# Patient Record
Sex: FEMALE | Race: ASIAN | NOT HISPANIC OR LATINO | Employment: FULL TIME | URBAN - METROPOLITAN AREA
[De-identification: names, ages, dates, MRNs, and addresses within clinical notes are randomized per-mention and may not be internally consistent; named-entity substitution may affect disease eponyms.]

---

## 2018-02-25 ENCOUNTER — HOSPITAL ENCOUNTER (EMERGENCY)
Facility: HOSPITAL | Age: 63
Discharge: HOME | End: 2018-02-25
Attending: EMERGENCY MEDICINE
Payer: COMMERCIAL

## 2018-02-25 VITALS
BODY MASS INDEX: 21.31 KG/M2 | OXYGEN SATURATION: 98 % | TEMPERATURE: 98 F | RESPIRATION RATE: 16 BRPM | DIASTOLIC BLOOD PRESSURE: 79 MMHG | WEIGHT: 110 LBS | SYSTOLIC BLOOD PRESSURE: 158 MMHG | HEART RATE: 77 BPM

## 2018-02-25 DIAGNOSIS — I10 HYPERTENSION: ICD-10-CM

## 2018-02-25 LAB
ALBUMIN SERPL BCP-MCNC: 4.2 G/DL
ALP SERPL-CCNC: 112 U/L
ALT SERPL W/O P-5'-P-CCNC: 42 U/L
ANION GAP SERPL CALC-SCNC: 12 MMOL/L
AST SERPL-CCNC: 36 U/L
BACTERIA #/AREA URNS HPF: NORMAL /HPF
BILIRUB SERPL-MCNC: 0.6 MG/DL
BILIRUB UR QL STRIP: NEGATIVE
BUN SERPL-MCNC: 11 MG/DL
CALCIUM SERPL-MCNC: 10 MG/DL
CHLORIDE SERPL-SCNC: 107 MMOL/L
CLARITY UR: CLEAR
CO2 SERPL-SCNC: 23 MMOL/L
COLOR UR: YELLOW
CREAT SERPL-MCNC: 0.7 MG/DL
EST. GFR  (AFRICAN AMERICAN): >60 ML/MIN/1.73 M^2
EST. GFR  (NON AFRICAN AMERICAN): >60 ML/MIN/1.73 M^2
GLUCOSE SERPL-MCNC: 143 MG/DL
GLUCOSE UR QL STRIP: NEGATIVE
HGB UR QL STRIP: NEGATIVE
KETONES UR QL STRIP: NEGATIVE
LEUKOCYTE ESTERASE UR QL STRIP: ABNORMAL
MICROSCOPIC COMMENT: NORMAL
NITRITE UR QL STRIP: NEGATIVE
PH UR STRIP: 7 [PH] (ref 5–8)
POTASSIUM SERPL-SCNC: 3.8 MMOL/L
PROT SERPL-MCNC: 8.4 G/DL
PROT UR QL STRIP: NEGATIVE
RBC #/AREA URNS HPF: 2 /HPF (ref 0–4)
SODIUM SERPL-SCNC: 142 MMOL/L
SP GR UR STRIP: 1.01 (ref 1–1.03)
SQUAMOUS #/AREA URNS HPF: 1 /HPF
URN SPEC COLLECT METH UR: ABNORMAL
UROBILINOGEN UR STRIP-ACNC: NEGATIVE EU/DL
WBC #/AREA URNS HPF: 3 /HPF (ref 0–5)

## 2018-02-25 PROCEDURE — 99284 EMERGENCY DEPT VISIT MOD MDM: CPT | Mod: 25

## 2018-02-25 PROCEDURE — 81000 URINALYSIS NONAUTO W/SCOPE: CPT

## 2018-02-25 PROCEDURE — 96376 TX/PRO/DX INJ SAME DRUG ADON: CPT

## 2018-02-25 PROCEDURE — 25000003 PHARM REV CODE 250: Performed by: EMERGENCY MEDICINE

## 2018-02-25 PROCEDURE — 93010 ELECTROCARDIOGRAM REPORT: CPT | Mod: ,,, | Performed by: INTERNAL MEDICINE

## 2018-02-25 PROCEDURE — 93005 ELECTROCARDIOGRAM TRACING: CPT

## 2018-02-25 PROCEDURE — 96374 THER/PROPH/DIAG INJ IV PUSH: CPT

## 2018-02-25 PROCEDURE — 80053 COMPREHEN METABOLIC PANEL: CPT

## 2018-02-25 RX ORDER — CHLORTHALIDONE 25 MG/1
25 TABLET ORAL DAILY
COMMUNITY

## 2018-02-25 RX ORDER — ENALAPRILAT 1.25 MG/ML
2.5 INJECTION INTRAVENOUS
Status: COMPLETED | OUTPATIENT
Start: 2018-02-25 | End: 2018-02-25

## 2018-02-25 RX ORDER — NEOMYCIN SULFATE, POLYMYXIN B SULFATE AND HYDROCORTISONE 10; 3.5; 1 MG/ML; MG/ML; [USP'U]/ML
4 SUSPENSION/ DROPS AURICULAR (OTIC) 4 TIMES DAILY
Qty: 10 ML | Refills: 1 | Status: SHIPPED | OUTPATIENT
Start: 2018-02-25 | End: 2018-03-01

## 2018-02-25 RX ORDER — LISINOPRIL 40 MG/1
40 TABLET ORAL DAILY
COMMUNITY

## 2018-02-25 RX ORDER — LOSARTAN POTASSIUM 100 MG/1
100 TABLET ORAL DAILY
COMMUNITY

## 2018-02-25 RX ORDER — AMLODIPINE BESYLATE 5 MG/1
5 TABLET ORAL DAILY
COMMUNITY

## 2018-02-25 RX ORDER — CLONIDINE HYDROCHLORIDE 0.1 MG/1
0.1 TABLET ORAL
Status: COMPLETED | OUTPATIENT
Start: 2018-02-25 | End: 2018-02-25

## 2018-02-25 RX ADMIN — ENALAPRILAT 2.5 MG: 2.5 INJECTION INTRAVENOUS at 12:02

## 2018-02-25 RX ADMIN — ENALAPRILAT 2.5 MG: 2.5 INJECTION INTRAVENOUS at 11:02

## 2018-02-25 RX ADMIN — CLONIDINE HYDROCHLORIDE 0.1 MG: 0.1 TABLET ORAL at 12:02

## 2018-02-25 NOTE — ED PROVIDER NOTES
" Encounter Date: 2/25/2018    SCRIBE #1 NOTE: I, Catarino Zamora, am scribing for, and in the presence of, William Rueda MD.       History     Chief Complaint   Patient presents with    Hypertension     reports 195/121 after taking Norvasc this morning; reports "a little blood came out ear (left);" family reports she has been off her meds for 3 days because she thought she had the flu; reports sore throat and cough; denies chest pain; reports taking OTC cough medications     CC: High Blood Pressure    62 y.o.female with HTN presents to the ED c/o having high blood pressure this morning after not taking her Norvasc for the last 3 days because of a cold she had. She worried after noticing blood in her right ear. The patient reports that the fever and chills she had have subsided. She denies chest pain, SOB, ear pain, nausea, vomiting, diarrhea, and any other symptoms.          Review of patient's allergies indicates:  No Known Allergies  Past Medical History:   Diagnosis Date    Hypertension      History reviewed. No pertinent surgical history.  Family History   Problem Relation Age of Onset    Diabetes Mother      Social History   Substance Use Topics    Smoking status: Never Smoker    Smokeless tobacco: Never Used    Alcohol use No     Review of Systems   Constitutional: Negative for chills and fever.   HENT: Negative for congestion, ear pain and rhinorrhea.         (+) bleeding from right ear   Eyes: Negative for pain and visual disturbance.   Respiratory: Negative for cough and shortness of breath.    Cardiovascular: Negative for chest pain.   Gastrointestinal: Negative for abdominal pain, diarrhea, nausea and vomiting.   Genitourinary: Negative for dysuria.   Musculoskeletal: Negative for back pain and neck pain.   Skin: Negative for rash.   Neurological: Negative for headaches.       Physical Exam     Initial Vitals [02/25/18 1117]   BP Pulse Resp Temp SpO2   (!) 238/108 86 20 97.1 °F (36.2 °C) 99 %      MAP  "      151.33         Physical Exam    Nursing note and vitals reviewed.  Constitutional: She appears well-developed and well-nourished.  Non-toxic appearance. She does not appear ill.   HENT:   Head: Normocephalic and atraumatic.   Eyes: EOM are normal.   Neck: Neck supple.   Cardiovascular: Normal rate and regular rhythm.   Murmur heard.   Systolic murmur is present with a grade of 2/6   Pulmonary/Chest: Effort normal and breath sounds normal. No respiratory distress.   Abdominal: Soft. Normal appearance and bowel sounds are normal. She exhibits no distension. There is no tenderness.   Musculoskeletal: Normal range of motion.   Neurological: She is alert.   Skin: Skin is warm and dry.   Psychiatric: She has a normal mood and affect.         ED Course   Procedures  Labs Reviewed   COMPREHENSIVE METABOLIC PANEL - Abnormal; Notable for the following:        Result Value    Glucose 143 (*)     All other components within normal limits   URINALYSIS - Abnormal; Notable for the following:     Leukocytes, UA Trace (*)     All other components within normal limits   URINALYSIS MICROSCOPIC     Imaging Results    None                 Medical Decision Making:   ED Management:  At 1400 hours the patient's blood pressure has gone down to 142/80. She is safe for discharge and has agreed to continue taking her blood pressure medication as prescribed. She will follow up with her doctor next week. She has verbalized understanding through her daughter, who is the .             Scribe Attestation:   Scribe #1: I performed the above scribed service and the documentation accurately describes the services I performed. I attest to the accuracy of the note.    Attending Attestation:           Physician Attestation for Scribe:  Physician Attestation Statement for Scribe #1: I, William Rueda MD, reviewed documentation, as scribed by Ctaarino Zamora in my presence, and it is both accurate and complete.                 ED Course as of  Mar 21 0650   Sun Feb 25, 2018   1410 No evidence of end-organ damage on lab survey.  [KJ]   1411 Blood pressure 142/84 at this time.  Patient able to stand, ambulate without difficulty and without being dizzy: She is otherwise asymptomatic.  The importance of taking both antihypertensive medicines was emphasized, through her family member .  She indicated understanding.  As also noted on physical exam that she had an excoriated right external auditory canal: Treat this with some topical, antibiotic otic drops  [KJ]      ED Course User Index  [KJ] William Rueda MD     Clinical Impression:   The encounter diagnosis was Hypertension.                           William Rueda MD  03/21/18 0615

## 2018-02-25 NOTE — ED TRIAGE NOTES
"Pt arrived via personal transportation from home. CC of hypertension. Pt family at bedside stated "she has been sick the past few days, so she hasn't taken any of her medications. Today she took her blood pressure and it was high so she took the norvasc." Pt presents with elevated BP, denies N/V/headache/blurry vision. Pt also reports cleaning her ears with q-tip and saw bloody drainage from the right ear. NAD at this time.  "

## 2021-08-17 DIAGNOSIS — Z12.31 SCREENING MAMMOGRAM, ENCOUNTER FOR: Primary | ICD-10-CM

## 2021-09-15 ENCOUNTER — HOSPITAL ENCOUNTER (OUTPATIENT)
Dept: RADIOLOGY | Facility: HOSPITAL | Age: 66
Discharge: HOME | End: 2021-09-15
Attending: FAMILY MEDICINE
Payer: COMMERCIAL

## 2021-09-15 VITALS — WEIGHT: 110 LBS | BODY MASS INDEX: 21.31 KG/M2

## 2021-09-15 DIAGNOSIS — Z12.31 SCREENING MAMMOGRAM, ENCOUNTER FOR: ICD-10-CM

## 2021-09-15 PROCEDURE — 77063 BREAST TOMOSYNTHESIS BI: CPT | Mod: 26,,, | Performed by: RADIOLOGY

## 2021-09-15 PROCEDURE — 77063 MAMMO DIGITAL SCREENING BILAT WITH TOMO: ICD-10-PCS | Mod: 26,,, | Performed by: RADIOLOGY

## 2021-09-15 PROCEDURE — 77067 SCR MAMMO BI INCL CAD: CPT | Mod: TC

## 2021-09-15 PROCEDURE — 77067 SCR MAMMO BI INCL CAD: CPT | Mod: 26,,, | Performed by: RADIOLOGY

## 2021-09-15 PROCEDURE — 77067 MAMMO DIGITAL SCREENING BILAT WITH TOMO: ICD-10-PCS | Mod: 26,,, | Performed by: RADIOLOGY

## 2021-09-29 ENCOUNTER — HOSPITAL ENCOUNTER (OUTPATIENT)
Dept: RADIOLOGY | Facility: HOSPITAL | Age: 66
Discharge: HOME | End: 2021-09-29
Attending: FAMILY MEDICINE
Payer: COMMERCIAL

## 2021-09-29 DIAGNOSIS — R92.8 ABNORMAL MAMMOGRAM OF LEFT BREAST: ICD-10-CM

## 2021-09-29 PROCEDURE — 77061 BREAST TOMOSYNTHESIS UNI: CPT | Mod: TC,LT

## 2021-09-29 PROCEDURE — 77061 MAMMO DIGITAL DIAGNOSTIC LEFT WITH TOMO: ICD-10-PCS | Mod: 26,LT,, | Performed by: RADIOLOGY

## 2021-09-29 PROCEDURE — 77065 DX MAMMO INCL CAD UNI: CPT | Mod: 26,LT,, | Performed by: RADIOLOGY

## 2021-09-29 PROCEDURE — 77061 BREAST TOMOSYNTHESIS UNI: CPT | Mod: 26,LT,, | Performed by: RADIOLOGY

## 2021-09-29 PROCEDURE — 77065 MAMMO DIGITAL DIAGNOSTIC LEFT WITH TOMO: ICD-10-PCS | Mod: 26,LT,, | Performed by: RADIOLOGY

## 2022-10-05 ENCOUNTER — HOSPITAL ENCOUNTER (OUTPATIENT)
Dept: RADIOLOGY | Facility: HOSPITAL | Age: 67
Discharge: HOME | End: 2022-10-05
Attending: FAMILY MEDICINE

## 2022-10-05 VITALS — BODY MASS INDEX: 21.6 KG/M2 | HEIGHT: 60 IN | WEIGHT: 110 LBS

## 2022-10-05 DIAGNOSIS — Z12.31 VISIT FOR SCREENING MAMMOGRAM: ICD-10-CM

## 2022-10-05 PROCEDURE — 77063 MAMMO DIGITAL SCREENING BILAT WITH TOMO: ICD-10-PCS | Mod: 26,,, | Performed by: RADIOLOGY

## 2022-10-05 PROCEDURE — 77067 MAMMO DIGITAL SCREENING BILAT WITH TOMO: ICD-10-PCS | Mod: 26,,, | Performed by: RADIOLOGY

## 2022-10-05 PROCEDURE — 77067 SCR MAMMO BI INCL CAD: CPT | Mod: 26,,, | Performed by: RADIOLOGY

## 2022-10-05 PROCEDURE — 77063 BREAST TOMOSYNTHESIS BI: CPT | Mod: 26,,, | Performed by: RADIOLOGY

## 2022-10-05 PROCEDURE — 77063 BREAST TOMOSYNTHESIS BI: CPT | Mod: TC

## 2023-09-27 DIAGNOSIS — Z12.31 ENCOUNTER FOR SCREENING MAMMOGRAM FOR MALIGNANT NEOPLASM OF BREAST: Primary | ICD-10-CM

## 2023-11-01 ENCOUNTER — HOSPITAL ENCOUNTER (OUTPATIENT)
Dept: RADIOLOGY | Facility: HOSPITAL | Age: 68
Discharge: HOME | End: 2023-11-01
Attending: FAMILY MEDICINE
Payer: MEDICARE

## 2023-11-01 DIAGNOSIS — Z12.31 ENCOUNTER FOR SCREENING MAMMOGRAM FOR MALIGNANT NEOPLASM OF BREAST: ICD-10-CM

## 2023-11-01 PROCEDURE — 77067 MAMMO DIGITAL SCREENING BILAT WITH TOMO: ICD-10-PCS | Mod: 26,,, | Performed by: RADIOLOGY

## 2023-11-01 PROCEDURE — 77067 SCR MAMMO BI INCL CAD: CPT | Mod: 26,,, | Performed by: RADIOLOGY

## 2023-11-01 PROCEDURE — 77067 SCR MAMMO BI INCL CAD: CPT | Mod: TC

## 2023-11-01 PROCEDURE — 77063 MAMMO DIGITAL SCREENING BILAT WITH TOMO: ICD-10-PCS | Mod: 26,,, | Performed by: RADIOLOGY

## 2023-11-01 PROCEDURE — 77063 BREAST TOMOSYNTHESIS BI: CPT | Mod: 26,,, | Performed by: RADIOLOGY

## 2024-04-17 ENCOUNTER — OFFICE VISIT (OUTPATIENT)
Dept: CARDIOLOGY | Facility: CLINIC | Age: 69
End: 2024-04-17
Payer: MEDICARE

## 2024-04-17 VITALS
OXYGEN SATURATION: 99 % | SYSTOLIC BLOOD PRESSURE: 142 MMHG | DIASTOLIC BLOOD PRESSURE: 86 MMHG | BODY MASS INDEX: 21.92 KG/M2 | HEART RATE: 65 BPM | WEIGHT: 112.19 LBS

## 2024-04-17 DIAGNOSIS — E78.49 OTHER HYPERLIPIDEMIA: ICD-10-CM

## 2024-04-17 DIAGNOSIS — I10 PRIMARY HYPERTENSION: Primary | ICD-10-CM

## 2024-04-17 DIAGNOSIS — R07.89 OTHER CHEST PAIN: ICD-10-CM

## 2024-04-17 DIAGNOSIS — E11.9 TYPE 2 DIABETES MELLITUS WITHOUT COMPLICATION, WITHOUT LONG-TERM CURRENT USE OF INSULIN: ICD-10-CM

## 2024-04-17 PROCEDURE — 3079F DIAST BP 80-89 MM HG: CPT | Mod: CPTII,S$GLB,, | Performed by: INTERNAL MEDICINE

## 2024-04-17 PROCEDURE — 1101F PT FALLS ASSESS-DOCD LE1/YR: CPT | Mod: CPTII,S$GLB,, | Performed by: INTERNAL MEDICINE

## 2024-04-17 PROCEDURE — 1126F AMNT PAIN NOTED NONE PRSNT: CPT | Mod: CPTII,S$GLB,, | Performed by: INTERNAL MEDICINE

## 2024-04-17 PROCEDURE — 99204 OFFICE O/P NEW MOD 45 MIN: CPT | Mod: S$GLB,,, | Performed by: INTERNAL MEDICINE

## 2024-04-17 PROCEDURE — 3077F SYST BP >= 140 MM HG: CPT | Mod: CPTII,S$GLB,, | Performed by: INTERNAL MEDICINE

## 2024-04-17 PROCEDURE — 93000 ELECTROCARDIOGRAM COMPLETE: CPT | Mod: S$GLB,,, | Performed by: INTERNAL MEDICINE

## 2024-04-17 PROCEDURE — 1159F MED LIST DOCD IN RCRD: CPT | Mod: CPTII,S$GLB,, | Performed by: INTERNAL MEDICINE

## 2024-04-17 PROCEDURE — 3008F BODY MASS INDEX DOCD: CPT | Mod: CPTII,S$GLB,, | Performed by: INTERNAL MEDICINE

## 2024-04-17 PROCEDURE — 99999 PR PBB SHADOW E&M-EST. PATIENT-LVL III: CPT | Mod: PBBFAC,,, | Performed by: INTERNAL MEDICINE

## 2024-04-17 PROCEDURE — 3288F FALL RISK ASSESSMENT DOCD: CPT | Mod: CPTII,S$GLB,, | Performed by: INTERNAL MEDICINE

## 2024-04-17 RX ORDER — ROSUVASTATIN CALCIUM 40 MG/1
40 TABLET, COATED ORAL NIGHTLY
COMMUNITY

## 2024-04-17 RX ORDER — DAPAGLIFLOZIN 10 MG/1
10 TABLET, FILM COATED ORAL DAILY
COMMUNITY

## 2024-04-17 RX ORDER — NAPROXEN 500 MG/1
500 TABLET ORAL 2 TIMES DAILY WITH MEALS
COMMUNITY

## 2024-04-17 RX ORDER — AMLODIPINE BESYLATE 10 MG/1
10 TABLET ORAL DAILY
COMMUNITY

## 2024-04-17 RX ORDER — METFORMIN HYDROCHLORIDE 750 MG/1
750 TABLET, EXTENDED RELEASE ORAL 2 TIMES DAILY
COMMUNITY
Start: 2024-04-15

## 2024-04-17 RX ORDER — SITAGLIPTIN 100 MG/1
100 TABLET, FILM COATED ORAL DAILY
COMMUNITY
Start: 2024-04-15

## 2024-04-17 RX ORDER — TIZANIDINE HYDROCHLORIDE 2 MG/1
CAPSULE, GELATIN COATED ORAL
COMMUNITY

## 2024-04-17 NOTE — PROGRESS NOTES
Cardiology    4/17/2024  9:41 AM    Problem list  Patient Active Problem List   Diagnosis    Hypertension    Type 2 diabetes mellitus without complication, without long-term current use of insulin    Other hyperlipidemia       CC:  Cardiac checkup    HPI:  Patient is self-referred for cardiac evaluation.  Patient is a pleasant 68-year-old woman with a history of hypertension, diabetes, hyperlipidemia who presents for cardiac checkup.  She denies any prior cardiac problems.  Patient states that she gets shortness of breath on occasion especially when she is laughing.  She denies any exertional symptoms.  She denies any palpitation or syncope.  She does not smoke.  Family history is negative for premature coronary disease.  Her mother had cardiac problems after 65.    Medications  Current Outpatient Medications   Medication Sig Dispense Refill    amLODIPine (NORVASC) 10 MG tablet Take 10 mg by mouth once daily.      dapagliflozin propanediol (FARXIGA) 10 mg tablet Take 10 mg by mouth once daily.      JANUVIA 100 mg Tab Take 100 mg by mouth once daily.      metFORMIN (GLUCOPHAGE-XR) 750 MG ER 24hr tablet Take 750 mg by mouth 2 (two) times daily.      naproxen (EC NAPROSYN) 500 MG EC tablet Take 500 mg by mouth 2 (two) times daily with meals.      rosuvastatin (CRESTOR) 40 MG Tab Take 40 mg by mouth every evening.      tiZANidine 2 mg Cap Take by mouth.       No current facility-administered medications for this visit.      Prior to Admission medications    Medication Sig Start Date End Date Taking? Authorizing Provider   amLODIPine (NORVASC) 10 MG tablet Take 10 mg by mouth once daily.   Yes Provider, Historical   dapagliflozin propanediol (FARXIGA) 10 mg tablet Take 10 mg by mouth once daily.   Yes Provider, Historical   JANUVIA 100 mg Tab Take 100 mg by mouth once daily. 4/15/24  Yes Provider, Historical   metFORMIN (GLUCOPHAGE-XR) 750 MG ER 24hr tablet Take 750 mg by mouth 2 (two) times daily. 4/15/24  Yes  Provider, Historical   naproxen (EC NAPROSYN) 500 MG EC tablet Take 500 mg by mouth 2 (two) times daily with meals.   Yes Provider, Historical   rosuvastatin (CRESTOR) 40 MG Tab Take 40 mg by mouth every evening.   Yes Provider, Historical   tiZANidine 2 mg Cap Take by mouth.   Yes Provider, Historical   amLODIPine (NORVASC) 5 MG tablet Take 5 mg by mouth once daily.  4/17/24  Provider, Historical   canagliflozin-metformin (INVOKAMET) 150-1,000 mg Tab Take by mouth 2 (two) times daily.  4/17/24  Provider, Historical   chlorthalidone (HYGROTEN) 25 MG Tab Take 25 mg by mouth once daily.  4/17/24  Provider, Historical   lisinopril (PRINIVIL,ZESTRIL) 40 MG tablet Take 40 mg by mouth once daily.  4/17/24  Provider, Historical   losartan (COZAAR) 100 MG tablet Take 100 mg by mouth once daily.  4/17/24  Provider, Historical         History  Past Medical History:   Diagnosis Date    Hypertension      No past surgical history on file.  Social History     Socioeconomic History    Marital status:    Tobacco Use    Smoking status: Never    Smokeless tobacco: Never   Substance and Sexual Activity    Alcohol use: No    Drug use: No    Sexual activity: Yes     Partners: Male     Birth control/protection: Post-menopausal   Social History Narrative     for 18 years.     works at [employer].                 Allergies  Review of patient's allergies indicates:  No Known Allergies      Review of Systems   Review of Systems   Constitutional: Negative for decreased appetite, fever and weight loss.   HENT:  Negative for congestion and nosebleeds.    Eyes:  Negative for double vision, vision loss in left eye, vision loss in right eye and visual disturbance.   Cardiovascular:  Negative for chest pain, claudication, cyanosis, dyspnea on exertion, irregular heartbeat, leg swelling, near-syncope, orthopnea, palpitations, paroxysmal nocturnal dyspnea and syncope.   Respiratory:  Negative for cough, hemoptysis, shortness of  breath, sleep disturbances due to breathing, snoring, sputum production and wheezing.    Endocrine: Negative for cold intolerance and heat intolerance.   Skin:  Negative for nail changes and rash.   Musculoskeletal:  Negative for joint pain, muscle cramps, muscle weakness and myalgias.   Gastrointestinal:  Negative for change in bowel habit, heartburn, hematemesis, hematochezia, hemorrhoids and melena.   Neurological:  Negative for dizziness, focal weakness and headaches.         Physical Exam  Wt Readings from Last 1 Encounters:   04/17/24 50.9 kg (112 lb 3.4 oz)     BP Readings from Last 3 Encounters:   04/17/24 (!) 142/86   02/25/18 (!) 158/79   10/18/12 154/88     Pulse Readings from Last 1 Encounters:   04/17/24 65     Body mass index is 21.92 kg/m².    Physical Exam  Constitutional:       Appearance: She is well-developed.   HENT:      Head: Atraumatic.   Eyes:      General: No scleral icterus.  Neck:      Vascular: Normal carotid pulses. No carotid bruit, hepatojugular reflux or JVD.   Cardiovascular:      Rate and Rhythm: Normal rate and regular rhythm.      Chest Wall: PMI is not displaced.      Pulses: Intact distal pulses.           Carotid pulses are 2+ on the right side and 2+ on the left side.       Radial pulses are 2+ on the right side and 2+ on the left side.        Dorsalis pedis pulses are 2+ on the right side and 2+ on the left side.      Heart sounds: Normal heart sounds, S1 normal and S2 normal. No murmur heard.     No friction rub.   Pulmonary:      Effort: Pulmonary effort is normal. No respiratory distress.      Breath sounds: Normal breath sounds. No stridor. No wheezing or rales.   Chest:      Chest wall: No tenderness.   Abdominal:      General: Bowel sounds are normal.      Palpations: Abdomen is soft.   Musculoskeletal:      Cervical back: Neck supple. No edema.   Skin:     General: Skin is warm and dry.      Nails: There is no clubbing.   Neurological:      Mental Status: She is alert  and oriented to person, place, and time.   Psychiatric:         Behavior: Behavior normal.         Thought Content: Thought content normal.             Assessment  1. Primary hypertension  Stable.  's at home.  Continue medications and monitor and adjust if needed.  Did not tolerate losartan due to itching and stopped by PCP    2. Type 2 diabetes mellitus without complication, without long-term current use of insulin  Stable on meds per PCP    3. Other hyperlipidemia  On statin    4. Other chest pain  atypical  - EKG 12-lead  - Echo; Future        Plan and Discussion  Discussed her EKG today which showed normal sinus rhythm rate of 62.  Discussed her blood pressure is well controlled at home recommend to continue current medications and monitor.  Will get an echocardiogram to assess atypical pain.      Follow Up  TBD      Scott Kaur MD, F.A.C.C, F.S.C.A.I.      Total professional time spent for the encounter: 40 minutes  Time was spent preparing to see the patient, reviewing results of prior testing, obtaining and/or reviewing separately obtained history, performing a medically appropriate examination and interview, counseling and educating the patient/family, ordering medications/tests/procedures, referring and communicating with other health care professionals, documenting clinical information in the electronic health record, and independently interpreting results.    Disclaimer: This document was created using voice recognition software (M*Modal Fluency Direct). Although it may be edited, this document may contain errors related to incorrect recognition of the spoken word. Please call the physician if clarification is needed.

## 2024-05-09 LAB
OHS QRS DURATION: 84 MS
OHS QTC CALCULATION: 448 MS

## 2024-10-22 ENCOUNTER — TELEPHONE (OUTPATIENT)
Dept: ENDOCRINOLOGY | Facility: CLINIC | Age: 69
End: 2024-10-22
Payer: MEDICARE

## 2024-10-22 NOTE — TELEPHONE ENCOUNTER
----- Message from Evita sent at 10/22/2024 11:13 AM CDT -----  Regarding: Daughter  Patient is requesting a sooner appointment.  Patient declined first available appointment listed as well as another facility and provider .  Patient will not accept being placed on the waitlist and is requesting a message be sent to doctor.    Name of Caller: Daughter    When is the first available appointment? Pt is scheduled for 11/21    Symptoms: Diabetes    Would the patient rather a call back or a response via [facility]? call    Best Call Back Number: [phone]      Additional Information: pt got letter in the mail with an already scheduled date of 11/21/24. Daughter was asking if she could be seen Wednesday 11/20/24 if possible. Daughter was requesting Dr. Jordy Aguirre, I'm not sure if office can accommodate that.

## 2025-01-17 ENCOUNTER — PATIENT OUTREACH (OUTPATIENT)
Dept: ADMINISTRATIVE | Facility: HOSPITAL | Age: 70
End: 2025-01-17
Payer: MEDICARE

## 2025-01-17 LAB — MICROALBUMIN/CREATININE RATIO: NORMAL UG/MG

## 2025-03-13 ENCOUNTER — LAB VISIT (OUTPATIENT)
Dept: LAB | Facility: HOSPITAL | Age: 70
End: 2025-03-13
Attending: HOSPITALIST
Payer: MEDICARE

## 2025-03-13 ENCOUNTER — OFFICE VISIT (OUTPATIENT)
Dept: ENDOCRINOLOGY | Facility: CLINIC | Age: 70
End: 2025-03-13
Payer: MEDICARE

## 2025-03-13 VITALS
DIASTOLIC BLOOD PRESSURE: 88 MMHG | HEART RATE: 62 BPM | BODY MASS INDEX: 21.56 KG/M2 | SYSTOLIC BLOOD PRESSURE: 126 MMHG | WEIGHT: 110.38 LBS

## 2025-03-13 DIAGNOSIS — E78.49 OTHER HYPERLIPIDEMIA: ICD-10-CM

## 2025-03-13 DIAGNOSIS — E11.65 TYPE 2 DIABETES MELLITUS WITH HYPERGLYCEMIA, WITHOUT LONG-TERM CURRENT USE OF INSULIN: ICD-10-CM

## 2025-03-13 DIAGNOSIS — I10 PRIMARY HYPERTENSION: ICD-10-CM

## 2025-03-13 DIAGNOSIS — E11.9 TYPE 2 DIABETES MELLITUS WITHOUT COMPLICATION, WITHOUT LONG-TERM CURRENT USE OF INSULIN: ICD-10-CM

## 2025-03-13 DIAGNOSIS — E11.65 TYPE 2 DIABETES MELLITUS WITH HYPERGLYCEMIA, WITHOUT LONG-TERM CURRENT USE OF INSULIN: Primary | ICD-10-CM

## 2025-03-13 LAB
ESTIMATED AVG GLUCOSE: 154 MG/DL (ref 68–131)
HBA1C MFR BLD: 7 % (ref 4–5.6)

## 2025-03-13 PROCEDURE — 1101F PT FALLS ASSESS-DOCD LE1/YR: CPT | Mod: CPTII,S$GLB,, | Performed by: HOSPITALIST

## 2025-03-13 PROCEDURE — 3288F FALL RISK ASSESSMENT DOCD: CPT | Mod: CPTII,S$GLB,, | Performed by: HOSPITALIST

## 2025-03-13 PROCEDURE — 4010F ACE/ARB THERAPY RXD/TAKEN: CPT | Mod: CPTII,S$GLB,, | Performed by: HOSPITALIST

## 2025-03-13 PROCEDURE — 99204 OFFICE O/P NEW MOD 45 MIN: CPT | Mod: S$GLB,,, | Performed by: HOSPITALIST

## 2025-03-13 PROCEDURE — 99999 PR PBB SHADOW E&M-EST. PATIENT-LVL III: CPT | Mod: PBBFAC,,, | Performed by: HOSPITALIST

## 2025-03-13 PROCEDURE — 3008F BODY MASS INDEX DOCD: CPT | Mod: CPTII,S$GLB,, | Performed by: HOSPITALIST

## 2025-03-13 PROCEDURE — 3074F SYST BP LT 130 MM HG: CPT | Mod: CPTII,S$GLB,, | Performed by: HOSPITALIST

## 2025-03-13 PROCEDURE — 1160F RVW MEDS BY RX/DR IN RCRD: CPT | Mod: CPTII,S$GLB,, | Performed by: HOSPITALIST

## 2025-03-13 PROCEDURE — 3079F DIAST BP 80-89 MM HG: CPT | Mod: CPTII,S$GLB,, | Performed by: HOSPITALIST

## 2025-03-13 PROCEDURE — 83036 HEMOGLOBIN GLYCOSYLATED A1C: CPT | Performed by: HOSPITALIST

## 2025-03-13 PROCEDURE — 1159F MED LIST DOCD IN RCRD: CPT | Mod: CPTII,S$GLB,, | Performed by: HOSPITALIST

## 2025-03-13 PROCEDURE — 36415 COLL VENOUS BLD VENIPUNCTURE: CPT | Performed by: HOSPITALIST

## 2025-03-13 RX ORDER — INSULIN PUMP SYRINGE, 3 ML
EACH MISCELLANEOUS
Qty: 1 EACH | Refills: 0 | Status: SHIPPED | OUTPATIENT
Start: 2025-03-13

## 2025-03-13 RX ORDER — LANCING DEVICE
EACH MISCELLANEOUS
Qty: 1 EACH | Refills: 0 | Status: SHIPPED | OUTPATIENT
Start: 2025-03-13

## 2025-03-13 RX ORDER — LANCETS
EACH MISCELLANEOUS
Qty: 60 EACH | Refills: 9 | Status: SHIPPED | OUTPATIENT
Start: 2025-03-13

## 2025-03-13 RX ORDER — VALSARTAN 320 MG/1
320 TABLET ORAL DAILY
COMMUNITY

## 2025-03-13 NOTE — ASSESSMENT & PLAN NOTE
- lipid panel review today  - ASCVD Risk below: Statin: Taking  The ASCVD Risk score (Markell WALLACE, et al., 2019) failed to calculate for the following reasons:    Cannot find a previous HDL lab    Cannot find a previous total cholesterol lab

## 2025-03-13 NOTE — ASSESSMENT & PLAN NOTE
- Diabetes is NOT AT GOAL, will need to check A1C on 3/13/2025.  - Therapy Goals: Discussed the aim to achieve optimal control without causing hypoglycemia, Targeting an A1C of <7%.  - Diabetic Supplies and Medications: Reviewed to ensure continued refills and compliance.  - Preventive Care: Advised the patient to schedule periodic eye exams and maintain regular foot care monitoring.  - Annual Monitoring: Reviewed the importance of yearly lipid profile (with statin use) and urine protein/creatinine tests.    Plan  - Changes:  Continue Jardiance 100 mg daily, continue metformin 750 twice a day  - confirm with patient she is no longer using Ozempic/pioglitazone  - Dietary Modifications: Encouraged portion size control and reduction of carbohydrate intake to better manage diabetes.  - diabetes supply:  Lantus/glucometer sent to her pharmacy, advised to check glucose regularly  - Hypoglycemia Management: Discussed symptoms and treatment of hypoglycemia. The patient is informed  - Clear written instructions provided on AVS. Follow-up scheduled within the next 3-4 months with lab work prior.   - Appointment date and time were provided to the patient today.

## 2025-03-13 NOTE — PROGRESS NOTES
"Subjective:      Patient ID: Hermes Ortega is a 69 y.o. female presented to [facility] today.  Chief complaint:  Diabetes      History of Present illness: Hermes Ortega is a 69 y.o. female here for  type 2 diabetes  Other significant past medical history:  Hypertension, hyperlipidemia  Current PCP KaurProsper pollock MD    Interval history:  Patient is here to establish care for type 2 diabetes, unfortunately no records from PCP, therefore limited info available  Patient could not recall her most recent A1c.  She was also given Ozempic in the past by primary care, intolerance due to GI issue.  In clinic glucose check: 114       Diabetes mellitus Type 2  - Known diabetic complications: none  - Diagnosed w/ DM: in >5 years   - Diabetes Education: No  - Family history of diabetes: Yes  - Hx of pancreatitis/Diabetes Related Hospitalization:  None    Current reported meds:               Januvia 100 mg nightly   Metformin 750 mg twice a day  Previous meds tried:   Ozempic: 1x intolerance   Pioglixatione: not taking  Home glucose checks:  Does not check glucose at home, no glucometer    Diet/Exercise:   - Eating 2*meals per day, does eat rice, and noodles.  Does practice portion control  - Exercise:  Not very active  - Weight trend: stable  - Occupation:  Stable    Health maintenance/prevention:  - Hypoglycemia: Aware a treatment plan for hypoglycemia  - Statin: Taking  - ACE/ARB: Not taking  - Urine microalbumin yearly, done  - Eye exam current (within one year): yes,  DR: unknown  - Check feet regularly denies cuts or ulcers on feet  - VINCENZO: No, History of obstructive sleep apnea    Diabetes lab work  No results found for: "HGBA1C", "JBFJXUC2R"  No results found for: "CPEPTIDE", "GLUTAMICACID", "ISLETCELLANT"     Random serum glucose:   Lab Results   Component Value Date     (H) 02/25/2018     No results found for: "FRUCTOSAMINE", "GLYCOMARKTM"  Lab Results   Component Value Date    MICALBCREAT " "150/1.0 10/14/2024     Diabetes Management Status: Reviewed this office visit  Screening or Prevention Patient's value Goal Complete/Controlled?   Lipid profile : 10/14/2024 Annually No   Dilated retinal exam : 01/31/2019 Annually Yes   Foot exam   Most Recent Foot Exam Date: Not Found Annually Yes      The patient's medications, allergies, past medical, surgical, social and family histories were reviewed and updated as appropriate.  Review of Systems: pertinent positives as per the above HPI, and otherwise negative.    Objective:   /88   Pulse 62   Wt 50.1 kg (110 lb 6.4 oz)   BMI 21.56 kg/m²   Body mass index is 21.56 kg/m².  Vital signs reviewed    Physical Exam  Vitals and nursing note reviewed.   Constitutional:       Appearance: Normal appearance. She is well-developed. She is not ill-appearing.   Neck:      Thyroid: No thyromegaly.   Pulmonary:      Effort: Pulmonary effort is normal. No respiratory distress.   Musculoskeletal:         General: Normal range of motion.      Cervical back: Normal range of motion.   Neurological:      General: No focal deficit present.      Mental Status: She is alert. Mental status is at baseline.   Psychiatric:         Mood and Affect: Mood normal.         Behavior: Behavior normal.       Labs reviewed:  See results in subjective  No results found for: "HGBA1C"  No results found for: "CHOL", "HDL", "LDLCALC", "TRIG", "CHOLHDL"  Lab Results   Component Value Date     02/25/2018    K 3.8 02/25/2018     02/25/2018    CO2 23 02/25/2018     (H) 02/25/2018    BUN 11 02/25/2018    CREATININE 0.7 02/25/2018    CALCIUM 10.0 02/25/2018    PROT 8.4 02/25/2018    ALBUMIN 4.2 02/25/2018    BILITOT 0.6 02/25/2018    ALKPHOS 112 02/25/2018    AST 36 02/25/2018    ALT 42 02/25/2018    ANIONGAP 12 02/25/2018     Assessment     1. Type 2 diabetes mellitus with hyperglycemia, without long-term current use of insulin  Hemoglobin A1C    Ambulatory referral/consult to " Optometry    Basic Metabolic Panel    Hemoglobin A1C    Microalbumin/Creatinine Ratio, Urine    Lipid Panel    Hemoglobin A1C    lancing device Misc    lancets Misc    blood-glucose meter kit    blood sugar diagnostic Strp      2. Type 2 diabetes mellitus without complication, without long-term current use of insulin        3. Primary hypertension        4. Other hyperlipidemia           Plan     Type 2 diabetes mellitus without complication, without long-term current use of insulin  - Diabetes is NOT AT GOAL, will need to check A1C on 3/13/2025.  - Therapy Goals: Discussed the aim to achieve optimal control without causing hypoglycemia, Targeting an A1C of <7%.  - Diabetic Supplies and Medications: Reviewed to ensure continued refills and compliance.  - Preventive Care: Advised the patient to schedule periodic eye exams and maintain regular foot care monitoring.  - Annual Monitoring: Reviewed the importance of yearly lipid profile (with statin use) and urine protein/creatinine tests.    Plan  - Changes:  Continue Jardiance 100 mg daily, continue metformin 750 twice a day  - confirm with patient she is no longer using Ozempic/pioglitazone  - Dietary Modifications: Encouraged portion size control and reduction of carbohydrate intake to better manage diabetes.  - diabetes supply:  Lantus/glucometer sent to her pharmacy, advised to check glucose regularly  - Hypoglycemia Management: Discussed symptoms and treatment of hypoglycemia. The patient is informed  - Clear written instructions provided on AVS. Follow-up scheduled within the next 3-4 months with lab work prior.   - Appointment date and time were provided to the patient today.     Hypertension  - Blood pressure review in clinic today  - On  blood pressure medication  - Manage by PCP     Other hyperlipidemia  - lipid panel review today  - ASCVD Risk below: Statin: Taking  The ASCVD Risk score (Markell DK, et al., 2019) failed to calculate for the following  reasons:    Cannot find a previous HDL lab    Cannot find a previous total cholesterol lab    Advised patient to follow up with PCP for routine health maintenance care.   RTC in four months    Jordy Aguirre M.D.  Endocrinology  [facility]  3/13/2025      Disclaimer: This note has been generated in part with the use of voice-recognition software. There may be typographical errors that have been missed during proof-reading.

## 2025-06-30 ENCOUNTER — LAB VISIT (OUTPATIENT)
Dept: LAB | Facility: HOSPITAL | Age: 70
End: 2025-06-30
Attending: HOSPITALIST
Payer: MEDICARE

## 2025-06-30 DIAGNOSIS — E11.65 TYPE 2 DIABETES MELLITUS WITH HYPERGLYCEMIA, WITHOUT LONG-TERM CURRENT USE OF INSULIN: ICD-10-CM

## 2025-06-30 LAB
ALBUMIN/CREAT UR: 26.7 UG/MG
ANION GAP (OHS): 9 MMOL/L (ref 8–16)
BUN SERPL-MCNC: 13 MG/DL (ref 8–23)
CALCIUM SERPL-MCNC: 8.7 MG/DL (ref 8.7–10.5)
CHLORIDE SERPL-SCNC: 109 MMOL/L (ref 95–110)
CHOLEST SERPL-MCNC: 136 MG/DL (ref 120–199)
CHOLEST/HDLC SERPL: 1.7 {RATIO} (ref 2–5)
CO2 SERPL-SCNC: 24 MMOL/L (ref 23–29)
CREAT SERPL-MCNC: 0.7 MG/DL (ref 0.5–1.4)
CREAT UR-MCNC: 75 MG/DL (ref 15–325)
EAG (OHS): 140 MG/DL (ref 68–131)
GFR SERPLBLD CREATININE-BSD FMLA CKD-EPI: >60 ML/MIN/1.73/M2
GLUCOSE SERPL-MCNC: 104 MG/DL (ref 70–110)
HBA1C MFR BLD: 6.5 % (ref 4–5.6)
HDLC SERPL-MCNC: 79 MG/DL (ref 40–75)
HDLC SERPL: 58.1 % (ref 20–50)
LDLC SERPL CALC-MCNC: 47.2 MG/DL (ref 63–159)
MICROALBUMIN UR-MCNC: 20 UG/ML (ref ?–5000)
NONHDLC SERPL-MCNC: 57 MG/DL
POTASSIUM SERPL-SCNC: 4.2 MMOL/L (ref 3.5–5.1)
SODIUM SERPL-SCNC: 142 MMOL/L (ref 136–145)
TRIGL SERPL-MCNC: 49 MG/DL (ref 30–150)

## 2025-06-30 PROCEDURE — 82043 UR ALBUMIN QUANTITATIVE: CPT

## 2025-06-30 PROCEDURE — 83036 HEMOGLOBIN GLYCOSYLATED A1C: CPT

## 2025-06-30 PROCEDURE — 36415 COLL VENOUS BLD VENIPUNCTURE: CPT

## 2025-06-30 PROCEDURE — 80061 LIPID PANEL: CPT

## 2025-06-30 PROCEDURE — 80048 BASIC METABOLIC PNL TOTAL CA: CPT

## 2025-07-07 ENCOUNTER — OFFICE VISIT (OUTPATIENT)
Dept: ENDOCRINOLOGY | Facility: CLINIC | Age: 70
End: 2025-07-07
Payer: MEDICARE

## 2025-07-07 VITALS
DIASTOLIC BLOOD PRESSURE: 71 MMHG | WEIGHT: 115.19 LBS | BODY MASS INDEX: 22.5 KG/M2 | HEART RATE: 67 BPM | SYSTOLIC BLOOD PRESSURE: 136 MMHG

## 2025-07-07 DIAGNOSIS — E11.65 TYPE 2 DIABETES MELLITUS WITH HYPERGLYCEMIA, WITHOUT LONG-TERM CURRENT USE OF INSULIN: Primary | ICD-10-CM

## 2025-07-07 DIAGNOSIS — I10 PRIMARY HYPERTENSION: ICD-10-CM

## 2025-07-07 DIAGNOSIS — E11.9 TYPE 2 DIABETES MELLITUS WITHOUT COMPLICATION, WITHOUT LONG-TERM CURRENT USE OF INSULIN: ICD-10-CM

## 2025-07-07 DIAGNOSIS — E78.49 OTHER HYPERLIPIDEMIA: ICD-10-CM

## 2025-07-07 PROCEDURE — 1159F MED LIST DOCD IN RCRD: CPT | Mod: CPTII,S$GLB,, | Performed by: HOSPITALIST

## 2025-07-07 PROCEDURE — 3061F NEG MICROALBUMINURIA REV: CPT | Mod: CPTII,S$GLB,, | Performed by: HOSPITALIST

## 2025-07-07 PROCEDURE — 3044F HG A1C LEVEL LT 7.0%: CPT | Mod: CPTII,S$GLB,, | Performed by: HOSPITALIST

## 2025-07-07 PROCEDURE — 3066F NEPHROPATHY DOC TX: CPT | Mod: CPTII,S$GLB,, | Performed by: HOSPITALIST

## 2025-07-07 PROCEDURE — 4010F ACE/ARB THERAPY RXD/TAKEN: CPT | Mod: CPTII,S$GLB,, | Performed by: HOSPITALIST

## 2025-07-07 PROCEDURE — 3075F SYST BP GE 130 - 139MM HG: CPT | Mod: CPTII,S$GLB,, | Performed by: HOSPITALIST

## 2025-07-07 PROCEDURE — 3288F FALL RISK ASSESSMENT DOCD: CPT | Mod: CPTII,S$GLB,, | Performed by: HOSPITALIST

## 2025-07-07 PROCEDURE — G2211 COMPLEX E/M VISIT ADD ON: HCPCS | Mod: S$GLB,,, | Performed by: HOSPITALIST

## 2025-07-07 PROCEDURE — 1101F PT FALLS ASSESS-DOCD LE1/YR: CPT | Mod: CPTII,S$GLB,, | Performed by: HOSPITALIST

## 2025-07-07 PROCEDURE — 99214 OFFICE O/P EST MOD 30 MIN: CPT | Mod: S$GLB,,, | Performed by: HOSPITALIST

## 2025-07-07 PROCEDURE — 3008F BODY MASS INDEX DOCD: CPT | Mod: CPTII,S$GLB,, | Performed by: HOSPITALIST

## 2025-07-07 PROCEDURE — 3078F DIAST BP <80 MM HG: CPT | Mod: CPTII,S$GLB,, | Performed by: HOSPITALIST

## 2025-07-07 PROCEDURE — 99999 PR PBB SHADOW E&M-EST. PATIENT-LVL III: CPT | Mod: PBBFAC,,, | Performed by: HOSPITALIST

## 2025-07-07 PROCEDURE — 1160F RVW MEDS BY RX/DR IN RCRD: CPT | Mod: CPTII,S$GLB,, | Performed by: HOSPITALIST

## 2025-07-07 RX ORDER — PIOGLITAZONE 30 MG/1
30 TABLET ORAL
Qty: 90 TABLET | Refills: 3 | Status: SHIPPED | OUTPATIENT
Start: 2025-07-07

## 2025-07-07 RX ORDER — SITAGLIPTIN 100 MG/1
100 TABLET, FILM COATED ORAL DAILY
Qty: 90 TABLET | Refills: 3 | Status: SHIPPED | OUTPATIENT
Start: 2025-07-07

## 2025-07-07 RX ORDER — METFORMIN HYDROCHLORIDE 750 MG/1
750 TABLET, EXTENDED RELEASE ORAL 2 TIMES DAILY
Qty: 180 TABLET | Refills: 3 | Status: SHIPPED | OUTPATIENT
Start: 2025-07-07

## 2025-07-07 RX ORDER — PIOGLITAZONE 45 MG/1
45 TABLET ORAL
COMMUNITY
Start: 2025-06-11 | End: 2025-07-07

## 2025-07-07 NOTE — ASSESSMENT & PLAN NOTE
- Diabetes is AT GOAL, will need to check A1C on 7/7/2025.  - Therapy Goals: Discussed the aim to achieve optimal control without causing hypoglycemia, Targeting an A1C of <7%.  - Diabetic Supplies and Medications: Reviewed to ensure continued refills and compliance.  - Preventive Care: Advised the patient to schedule periodic eye exams and maintain regular foot care monitoring.  - Annual Monitoring: Reviewed the importance of yearly lipid profile (with statin use) and urine protein/creatinine tests.    Plan  - Changes:  Continue Jardiance 100 mg daily, continue metformin 750 twice a day  - Decrease pioglitazone/Actos to 30 mg to be taking with dinner.  - She does get her medication pre package to morning, night doses.  Which makes it very hard for her to hold.    - confirm with patient no longer taking Farxiga  - Dietary Modifications: Encouraged portion size control and reduction of carbohydrate intake to better manage diabetes.  - diabetes supply:  Lantus/glucometer sent to her pharmacy, advised to check glucose regularly  - Hypoglycemia Management: Discussed symptoms and treatment of hypoglycemia. The patient is informed  - Clear written instructions provided on AVS. Follow-up scheduled within the next 4-6 months with lab work prior.   - Appointment date and time were provided to the patient today.

## 2025-07-07 NOTE — PROGRESS NOTES
Subjective:      Patient ID: Hermes Ortega is a 69 y.o. female presented to [facility] today.  Chief complaint:  Diabetes      History of Present illness: Hermes Ortega is a 69 y.o. female here for  type 2 diabetes  Other significant past medical history:  Hypertension, hyperlipidemia  Current PCP Avis Solis MD    INTERVAL HISTORY:  Patient is here to type 2 diabetes, current A1c of 6.5%, previously 7.0%  Patient reports 1 episode of hypoglycemia with glucose in the low 60s, patient had to fast for her colonoscopy in took her diabetes medication.  She does get her medication pre package to morning, night doses.  Which makes it very hard for her to hold.    Currently on Januvia, metformin, pioglitazone   Previously was on Farxiga by PCP, has since stopped.      Diabetes mellitus Type 2  - Known diabetic complications: none  - Diagnosed w/ DM: in >5 years   - Diabetes Education: No  - Family history of diabetes: Yes  - Hx of pancreatitis/Diabetes Related Hospitalization:  None  - 3/13/2025:  Patient is here to establish care for type 2 diabetes, unfortunately no records from PCP, therefore limited info available, A1c checked after clinic 7.0% 3/13/2025. She was also given Ozempic in the past by primary care, intolerance due to GI issue.In clinic glucose check: 114     Current reported meds:   Januvia 100 mg daily  Metformin 750 twice a day  Pioglitazone 45 mg   Previous meds tried:   Ozempic: 1x intolerance   Pioglixatione: not taking   Farixga   Home glucose checks:  Does not check glucose at home, no glucometer  100>77  115>100  117  87>83  96>70  120>99  87>100  124>78  113>120  Diet/Exercise:   - Eating 2*meals per day, does eat rice, and noodles.  Does practice portion control  - Exercise:  Not very active  - Weight trend: stable  - Occupation:  Stable    Health maintenance/prevention:  - Hypoglycemia: Aware a treatment plan for hypoglycemia  - Statin: Taking  - ACE/ARB: Not taking  -  "Urine microalbumin yearly, done  - Eye exam current (within one year): yes,  DR: unknown  - Check feet regularly denies cuts or ulcers on feet  - VINCENZO: No, History of obstructive sleep apnea    Diabetes lab work  Lab Results   Component Value Date    HGBA1C 6.5 (H) 06/30/2025    HGBA1C 7.0 (H) 03/13/2025     No results found for: "CPEPTIDE", "GLUTAMICACID", "ISLETCELLANT"     Random serum glucose:   Lab Results   Component Value Date     06/30/2025     (H) 02/25/2018     No results found for: "FRUCTOSAMINE", "GLYCOMARKTM"  Lab Results   Component Value Date    MICALBCREAT 26.7 06/30/2025    MICALBCREAT 150/1.0 10/14/2024     Diabetes Management Status: Reviewed this office visit  Screening or Prevention Patient's value Goal Complete/Controlled?   Lipid profile : 06/30/2025 Annually No   Dilated retinal exam : 01/31/2019 Annually Yes   Foot exam   Most Recent Foot Exam Date: Not Found Annually Yes      The patient's medications, allergies, past medical, surgical, social and family histories were reviewed and updated as appropriate.  Review of Systems: pertinent positives as per the above HPI, and otherwise negative.    Objective:   /71   Pulse 67   Wt 52.3 kg (115 lb 3.2 oz)   BMI 22.50 kg/m²   Body mass index is 22.5 kg/m².  Vital signs reviewed    Physical Exam  Vitals and nursing note reviewed.   Constitutional:       Appearance: Normal appearance. She is well-developed. She is not ill-appearing.   Neck:      Thyroid: No thyromegaly.   Pulmonary:      Effort: Pulmonary effort is normal. No respiratory distress.   Musculoskeletal:         General: Normal range of motion.      Cervical back: Normal range of motion.   Neurological:      General: No focal deficit present.      Mental Status: She is alert. Mental status is at baseline.   Psychiatric:         Mood and Affect: Mood normal.         Behavior: Behavior normal.       Labs reviewed:  See results in subjective  Lab Results   Component " Value Date    HGBA1C 6.5 (H) 06/30/2025     Lab Results   Component Value Date    CHOL 136 06/30/2025    HDL 79 (H) 06/30/2025    LDLCALC 47.2 (L) 06/30/2025    TRIG 49 06/30/2025    CHOLHDL 58.1 (H) 06/30/2025     Lab Results   Component Value Date     06/30/2025    K 4.2 06/30/2025     06/30/2025    CO2 24 06/30/2025     06/30/2025    BUN 13 06/30/2025    CREATININE 0.7 06/30/2025    CALCIUM 8.7 06/30/2025    PROT 8.4 02/25/2018    ALBUMIN 4.2 02/25/2018    BILITOT 0.6 02/25/2018    ALKPHOS 112 02/25/2018    AST 36 02/25/2018    ALT 42 02/25/2018    ANIONGAP 9 06/30/2025    EGFRNORACEVR >60 06/30/2025     Assessment     1. Type 2 diabetes mellitus with hyperglycemia, without long-term current use of insulin  JANUVIA 100 mg Tab    metFORMIN (GLUCOPHAGE-XR) 750 MG ER 24hr tablet    pioglitazone (ACTOS) 30 MG tablet    Hemoglobin A1C    Basic Metabolic Panel      2. Type 2 diabetes mellitus without complication, without long-term current use of insulin        3. Primary hypertension        4. Other hyperlipidemia          Plan     Type 2 diabetes mellitus without complication, without long-term current use of insulin  - Diabetes is AT GOAL, will need to check A1C on 7/7/2025.  - Therapy Goals: Discussed the aim to achieve optimal control without causing hypoglycemia, Targeting an A1C of <7%.  - Diabetic Supplies and Medications: Reviewed to ensure continued refills and compliance.  - Preventive Care: Advised the patient to schedule periodic eye exams and maintain regular foot care monitoring.  - Annual Monitoring: Reviewed the importance of yearly lipid profile (with statin use) and urine protein/creatinine tests.    Plan  - Changes:  Continue Jardiance 100 mg daily, continue metformin 750 twice a day  - Decrease pioglitazone/Actos to 30 mg to be taking with dinner.  - She does get her medication pre package to morning, night doses.  Which makes it very hard for her to hold.    - confirm with  patient no longer taking Farxiga  - Dietary Modifications: Encouraged portion size control and reduction of carbohydrate intake to better manage diabetes.  - diabetes supply:  Lantus/glucometer sent to her pharmacy, advised to check glucose regularly  - Hypoglycemia Management: Discussed symptoms and treatment of hypoglycemia. The patient is informed  - Clear written instructions provided on AVS. Follow-up scheduled within the next 4-6 months with lab work prior.   - Appointment date and time were provided to the patient today.     Hypertension  - Blood pressure review in clinic today  - On  blood pressure medication  - Manage by PCP     Other hyperlipidemia  - lipid panel review today  - ASCVD Risk below: Statin: Taking  The 10-year ASCVD risk score (Markell WALLACE, et al., 2019) is: 14.6%    Values used to calculate the score:      Age: 69 years      Sex: Female      Is Non- : No      Diabetic: Yes      Tobacco smoker: No      Systolic Blood Pressure: 136 mmHg      Is BP treated: Yes      HDL Cholesterol: 79 mg/dL      Total Cholesterol: 136 mg/dL    Advised patient to follow up with PCP for routine health maintenance care.   RTC in four months    Visit today included increased complexity associated with the care of the episodic problem addressed and managing the longitudinal care of the patient due to the serious and/or complex managed problem(s).   Including: Type 2 diabetes,  hypertension, hyperlipidemia.    Jordy Aguirre M.D.  Endocrinology  [facility]  7/7/2025      Disclaimer: This note has been generated in part with the use of voice-recognition software. There may be typographical errors that have been missed during proof-reading.

## 2025-07-07 NOTE — ASSESSMENT & PLAN NOTE
- lipid panel review today  - ASCVD Risk below: Statin: Taking  The 10-year ASCVD risk score (Markell WALLACE, et al., 2019) is: 14.6%    Values used to calculate the score:      Age: 69 years      Sex: Female      Is Non- : No      Diabetic: Yes      Tobacco smoker: No      Systolic Blood Pressure: 136 mmHg      Is BP treated: Yes      HDL Cholesterol: 79 mg/dL      Total Cholesterol: 136 mg/dL

## 2025-07-07 NOTE — PATIENT INSTRUCTIONS
B?nh ti?u ???ng     U?ng thu?c:  1) Metformin  750 mg (twice a day) giana l?n m?t ngày v?i ?ô? ?n   2) Januvia 100 mg (once a day in the morning) m?t l?n m?t ngày (bu?i sáng v?i ?ô? ?n)  3) ACTOS/pioglitazone 30 mg (once a day nightly) m?t l?n m?t ngày (bu?i t?i v?i ?ô? ?n)    l??ng ???ng tr??c khi ?n ph?i d??i 120  ???ng ml khi ?n 3 ti?ng ph?i d??i 160  ???ng tr??c khi ?i ng? d??i 120    Ch?a h? ???ng victoriano?t (d??i 70) b?ng 3 viên k?o ho?c n??c trái cây (orange juice/apple juice/Coke), ki?m tra l??ng ???ng ml 15 phút.

## 2025-08-08 ENCOUNTER — TELEPHONE (OUTPATIENT)
Dept: PHARMACY | Facility: CLINIC | Age: 70
End: 2025-08-08
Payer: MEDICARE

## 2025-08-08 NOTE — TELEPHONE ENCOUNTER
[facility]/Population Health Chart Review & Patient Outreach Details For Medication Adherence Project    Reason for Outreach Encounter: 3rd Party payor non-compliance report ([employer], [employer], C, etc)  2.  Patient Outreach Method: Reviewed patient chart   3.   Medication in question:    Hypertension Medications              amLODIPine (NORVASC) 10 MG tablet Take 10 mg by mouth once daily.    valsartan (DIOVAN) 320 MG tablet Take 320 mg by mouth once daily.                 valsartan  last filled  7/23 for 30 day supply      4.  Reviewed and or Updates Made To: Patient Chart  5. Outreach Outcomes and/or actions taken: Patient filled medication and is on track to be adherent  Additional Notes: